# Patient Record
Sex: FEMALE | Race: WHITE | Employment: UNEMPLOYED | URBAN - METROPOLITAN AREA
[De-identification: names, ages, dates, MRNs, and addresses within clinical notes are randomized per-mention and may not be internally consistent; named-entity substitution may affect disease eponyms.]

---

## 2017-09-20 ENCOUNTER — OFFICE VISIT (OUTPATIENT)
Dept: FAMILY MEDICINE CLINIC | Age: 27
End: 2017-09-20

## 2017-09-20 VITALS
WEIGHT: 108.25 LBS | RESPIRATION RATE: 18 BRPM | TEMPERATURE: 96.5 F | SYSTOLIC BLOOD PRESSURE: 110 MMHG | OXYGEN SATURATION: 96 % | HEIGHT: 63 IN | HEART RATE: 63 BPM | BODY MASS INDEX: 19.18 KG/M2 | DIASTOLIC BLOOD PRESSURE: 70 MMHG

## 2017-09-20 DIAGNOSIS — J01.00 ACUTE MAXILLARY SINUSITIS, RECURRENCE NOT SPECIFIED: Primary | ICD-10-CM

## 2017-09-20 PROCEDURE — 99213 OFFICE O/P EST LOW 20 MIN: CPT | Performed by: NURSE PRACTITIONER

## 2017-09-20 RX ORDER — AMOXICILLIN AND CLAVULANATE POTASSIUM 875; 125 MG/1; MG/1
1 TABLET, FILM COATED ORAL 2 TIMES DAILY
Qty: 20 TABLET | Refills: 0 | Status: SHIPPED | OUTPATIENT
Start: 2017-09-20 | End: 2017-09-30

## 2017-09-20 ASSESSMENT — ENCOUNTER SYMPTOMS
WHEEZING: 0
SORE THROAT: 0
EYE DISCHARGE: 0
EYE PAIN: 0
SHORTNESS OF BREATH: 0
SINUS PRESSURE: 1
VOMITING: 0
NAUSEA: 0
TROUBLE SWALLOWING: 0
DIARRHEA: 0
ABDOMINAL PAIN: 0
FACIAL SWELLING: 0
RHINORRHEA: 1
COUGH: 1

## 2018-01-30 ENCOUNTER — OFFICE VISIT (OUTPATIENT)
Dept: FAMILY MEDICINE CLINIC | Age: 28
End: 2018-01-30
Payer: COMMERCIAL

## 2018-01-30 VITALS
OXYGEN SATURATION: 99 % | HEART RATE: 84 BPM | RESPIRATION RATE: 18 BRPM | HEIGHT: 63 IN | TEMPERATURE: 99.4 F | BODY MASS INDEX: 18.98 KG/M2 | WEIGHT: 107.13 LBS

## 2018-01-30 DIAGNOSIS — J01.10 ACUTE FRONTAL SINUSITIS, RECURRENCE NOT SPECIFIED: Primary | ICD-10-CM

## 2018-01-30 PROCEDURE — G8427 DOCREV CUR MEDS BY ELIG CLIN: HCPCS | Performed by: NURSE PRACTITIONER

## 2018-01-30 PROCEDURE — G8420 CALC BMI NORM PARAMETERS: HCPCS | Performed by: NURSE PRACTITIONER

## 2018-01-30 PROCEDURE — G8484 FLU IMMUNIZE NO ADMIN: HCPCS | Performed by: NURSE PRACTITIONER

## 2018-01-30 PROCEDURE — 99213 OFFICE O/P EST LOW 20 MIN: CPT | Performed by: NURSE PRACTITIONER

## 2018-01-30 PROCEDURE — 1036F TOBACCO NON-USER: CPT | Performed by: NURSE PRACTITIONER

## 2018-01-30 RX ORDER — AMOXICILLIN AND CLAVULANATE POTASSIUM 875; 125 MG/1; MG/1
1 TABLET, FILM COATED ORAL 2 TIMES DAILY
Qty: 20 TABLET | Refills: 0 | Status: SHIPPED | OUTPATIENT
Start: 2018-01-30 | End: 2018-02-09

## 2018-01-30 RX ORDER — CETIRIZINE HYDROCHLORIDE 10 MG/1
10 TABLET ORAL DAILY
Qty: 30 TABLET | Refills: 3 | Status: SHIPPED | OUTPATIENT
Start: 2018-01-30 | End: 2022-02-15

## 2018-01-30 NOTE — PROGRESS NOTES
Subjective:      Patient ID: Kala Ying is a 28 y.o. female who presents today for:  Chief Complaint   Patient presents with    Headache     x 9 days. complains of headache, sinus pressure, ear pressure, sore throat. has tried Excedrin with mild relief. Sinusitis   This is a new problem. The current episode started in the past 7 days. The problem has been gradually worsening since onset. There has been no fever. Associated symptoms include congestion, coughing, ear pain, headaches, sinus pressure and a sore throat. Pertinent negatives include no chills, diaphoresis, hoarse voice, shortness of breath, sneezing or swollen glands. Treatments tried: excedrin. The treatment provided mild relief. History reviewed. No pertinent past medical history. Current Outpatient Prescriptions on File Prior to Visit   Medication Sig Dispense Refill    levonorgestrel (MIRENA, 52 MG,) IUD 52 mg 1 each by Intrauterine route once      fluticasone (FLONASE) 50 MCG/ACT nasal spray 2 sprays by Nasal route daily 1 Bottle 3     No current facility-administered medications on file prior to visit. Past Surgical History:   Procedure Laterality Date    CHOLECYSTECTOMY  01/30/2015     Family History   Problem Relation Age of Onset    Cancer Mother     Diabetes Maternal Grandmother     High Blood Pressure Maternal Grandmother     Cancer Maternal Grandfather      Lung     Social History     Social History    Marital status:      Spouse name: N/A    Number of children: N/A    Years of education: N/A     Occupational History    Not on file. Social History Main Topics    Smoking status: Never Smoker    Smokeless tobacco: Never Used    Alcohol use Not on file    Drug use: Unknown    Sexual activity: Not on file     Other Topics Concern    Not on file     Social History Narrative    No narrative on file     Allergies:  Review of patient's allergies indicates no known allergies.     Review of Systems has no wheezes. She has no rhonchi. She has no rales. Lymphadenopathy:     She has no cervical adenopathy. Neurological: She is alert and oriented to person, place, and time. Skin: Skin is warm. No rash noted. She is not diaphoretic. Vitals reviewed. Assessment & Plan:     1. Acute frontal sinusitis, recurrence not specified  amoxicillin-clavulanate (AUGMENTIN) 875-125 MG per tablet    cetirizine (ZYRTEC) 10 MG tablet     No orders of the defined types were placed in this encounter. Orders Placed This Encounter   Medications    amoxicillin-clavulanate (AUGMENTIN) 875-125 MG per tablet     Sig: Take 1 tablet by mouth 2 times daily for 10 days     Dispense:  20 tablet     Refill:  0    cetirizine (ZYRTEC) 10 MG tablet     Sig: Take 1 tablet by mouth daily     Dispense:  30 tablet     Refill:  3     There are no discontinued medications. Return if symptoms worsen or fail to improve, for PCP follow-up. Reviewed with the patient: current clinical status, medications, activities and diet. Side effects, adverse effects of the medication prescribed today, as well as treatment plan/ rationale and result expectations have been discussed with the patient who expresses understanding and desires to proceed. Pt instructions reviewed and given to patient.     Close follow up to evaluate treatment results and for coordination of care. I have reviewed the patient's medical history in detail and updated the computerized patient record.     Immanuel Mcfadden NP

## 2018-02-05 ASSESSMENT — ENCOUNTER SYMPTOMS
HOARSE VOICE: 0
FACIAL SWELLING: 0
TROUBLE SWALLOWING: 0
EYE DISCHARGE: 0
SINUS PRESSURE: 1
WHEEZING: 0
EYE PAIN: 0
ABDOMINAL PAIN: 0
SHORTNESS OF BREATH: 0
COUGH: 1
RHINORRHEA: 1
DIARRHEA: 0
NAUSEA: 0
SORE THROAT: 1
SWOLLEN GLANDS: 0
VOMITING: 0

## 2018-03-21 ENCOUNTER — OFFICE VISIT (OUTPATIENT)
Dept: FAMILY MEDICINE CLINIC | Age: 28
End: 2018-03-21
Payer: COMMERCIAL

## 2018-03-21 VITALS
HEIGHT: 63 IN | OXYGEN SATURATION: 98 % | SYSTOLIC BLOOD PRESSURE: 102 MMHG | BODY MASS INDEX: 19.36 KG/M2 | TEMPERATURE: 98.9 F | DIASTOLIC BLOOD PRESSURE: 76 MMHG | WEIGHT: 109.25 LBS | HEART RATE: 72 BPM | RESPIRATION RATE: 12 BRPM

## 2018-03-21 DIAGNOSIS — J10.1 INFLUENZA B: Primary | ICD-10-CM

## 2018-03-21 DIAGNOSIS — J02.9 PHARYNGITIS, UNSPECIFIED ETIOLOGY: ICD-10-CM

## 2018-03-21 LAB
INFLUENZA A ANTIBODY: ABNORMAL
INFLUENZA B ANTIBODY: ABNORMAL
S PYO AG THROAT QL: NORMAL

## 2018-03-21 PROCEDURE — 99213 OFFICE O/P EST LOW 20 MIN: CPT | Performed by: NURSE PRACTITIONER

## 2018-03-21 PROCEDURE — G8420 CALC BMI NORM PARAMETERS: HCPCS | Performed by: NURSE PRACTITIONER

## 2018-03-21 PROCEDURE — 87804 INFLUENZA ASSAY W/OPTIC: CPT | Performed by: NURSE PRACTITIONER

## 2018-03-21 PROCEDURE — 1036F TOBACCO NON-USER: CPT | Performed by: NURSE PRACTITIONER

## 2018-03-21 PROCEDURE — 87880 STREP A ASSAY W/OPTIC: CPT | Performed by: NURSE PRACTITIONER

## 2018-03-21 PROCEDURE — G8484 FLU IMMUNIZE NO ADMIN: HCPCS | Performed by: NURSE PRACTITIONER

## 2018-03-21 PROCEDURE — G8427 DOCREV CUR MEDS BY ELIG CLIN: HCPCS | Performed by: NURSE PRACTITIONER

## 2018-03-21 RX ORDER — OSELTAMIVIR PHOSPHATE 75 MG/1
75 CAPSULE ORAL 2 TIMES DAILY
Qty: 20 CAPSULE | Refills: 0 | Status: SHIPPED | OUTPATIENT
Start: 2018-03-21 | End: 2018-03-31

## 2018-03-21 RX ORDER — BENZONATATE 100 MG/1
100 CAPSULE ORAL 3 TIMES DAILY PRN
Qty: 21 CAPSULE | Refills: 0 | Status: SHIPPED | OUTPATIENT
Start: 2018-03-21 | End: 2018-03-28

## 2018-03-21 ASSESSMENT — ENCOUNTER SYMPTOMS
ABDOMINAL DISTENTION: 0
CHEST TIGHTNESS: 0
SHORTNESS OF BREATH: 0
SORE THROAT: 1
DIARRHEA: 0
VOMITING: 0
COUGH: 1
ABDOMINAL PAIN: 0
WHEEZING: 0
CONSTIPATION: 0
HEARTBURN: 0
SINUS PRESSURE: 1
RHINORRHEA: 0
HEMOPTYSIS: 0
NAUSEA: 0
SINUS PAIN: 0
TROUBLE SWALLOWING: 0

## 2018-03-21 NOTE — PROGRESS NOTES
Subjective  Marisa Platt, 33 y.o. female presents today with:  Chief Complaint   Patient presents with    Pharyngitis     x yesterday. complains of cough, congestion, sore throat, swollen glands, difficulty swallowing, bilteral ear pain. has tried IBU with no relief. Cough   This is a new problem. The current episode started yesterday. The problem has been rapidly worsening. The problem occurs every few minutes. The cough is non-productive. Associated symptoms include chills, ear pain, a fever, headaches, nasal congestion, postnasal drip, a sore throat and sweats. Pertinent negatives include no chest pain, ear congestion, heartburn, hemoptysis, myalgias, rash, rhinorrhea, shortness of breath, weight loss or wheezing. Nothing aggravates the symptoms. Treatments tried: flonase and ibiprofen. There is no history of asthma, bronchitis or pneumonia. Review of Systems   Constitutional: Positive for chills, diaphoresis, fatigue and fever. Negative for activity change, appetite change and weight loss. HENT: Positive for congestion, ear pain, postnasal drip, sinus pressure and sore throat. Negative for rhinorrhea, sinus pain and trouble swallowing. Respiratory: Positive for cough. Negative for hemoptysis, chest tightness, shortness of breath and wheezing. Cardiovascular: Negative for chest pain. Gastrointestinal: Negative for abdominal distention, abdominal pain, constipation, diarrhea, heartburn, nausea and vomiting. Musculoskeletal: Negative for myalgias. Skin: Negative for rash. Neurological: Positive for headaches. History reviewed. No pertinent past medical history. Past Surgical History:   Procedure Laterality Date    CHOLECYSTECTOMY  01/30/2015     Social History     Social History    Marital status:      Spouse name: N/A    Number of children: N/A    Years of education: N/A     Occupational History    Not on file.      Social History Main Topics    Smoking status: Never Smoker    Smokeless tobacco: Never Used    Alcohol use Not on file    Drug use: Unknown    Sexual activity: Not on file     Other Topics Concern    Not on file     Social History Narrative    No narrative on file     Family History   Problem Relation Age of Onset    Cancer Mother     Diabetes Maternal Grandmother     High Blood Pressure Maternal Grandmother     Cancer Maternal Grandfather      Lung     No Known Allergies  Current Outpatient Prescriptions   Medication Sig Dispense Refill    oseltamivir (TAMIFLU) 75 MG capsule Take 1 capsule by mouth 2 times daily for 10 days 20 capsule 0    benzonatate (TESSALON) 100 MG capsule Take 1 capsule by mouth 3 times daily as needed for Cough 21 capsule 0    cetirizine (ZYRTEC) 10 MG tablet Take 1 tablet by mouth daily 30 tablet 3    levonorgestrel (MIRENA, 52 MG,) IUD 52 mg 1 each by Intrauterine route once      fluticasone (FLONASE) 50 MCG/ACT nasal spray 2 sprays by Nasal route daily 1 Bottle 3     No current facility-administered medications for this visit. PMH, Surgical Hx, Family Hx, and Social Hx reviewed and updated. Health Maintenance reviewed. Objective    Vitals:    03/21/18 1809   BP: 102/76   Pulse: 72   Resp: 12   Temp: 98.9 °F (37.2 °C)   TempSrc: Temporal   SpO2: 98%   Weight: 109 lb 4 oz (49.6 kg)   Height: 5' 3\" (1.6 m)       Physical Exam   Constitutional: She is oriented to person, place, and time. She appears well-developed and well-nourished. No distress. HENT:   Head: Normocephalic and atraumatic. Right Ear: Hearing, tympanic membrane, external ear and ear canal normal.   Left Ear: Hearing, tympanic membrane, external ear and ear canal normal.   Nose: Mucosal edema present. Mouth/Throat: Uvula is midline and mucous membranes are normal. Posterior oropharyngeal erythema present. No oropharyngeal exudate, posterior oropharyngeal edema or tonsillar abscesses. Neck: Normal range of motion. Neck supple.  No

## 2018-09-28 ENCOUNTER — OFFICE VISIT (OUTPATIENT)
Dept: INTERNAL MEDICINE | Age: 28
End: 2018-09-28
Payer: COMMERCIAL

## 2018-09-28 VITALS
HEIGHT: 63 IN | HEART RATE: 53 BPM | OXYGEN SATURATION: 99 % | DIASTOLIC BLOOD PRESSURE: 82 MMHG | WEIGHT: 112.32 LBS | TEMPERATURE: 98.2 F | BODY MASS INDEX: 19.9 KG/M2 | SYSTOLIC BLOOD PRESSURE: 130 MMHG

## 2018-09-28 DIAGNOSIS — B37.2 YEAST INFECTION OF THE SKIN: Primary | ICD-10-CM

## 2018-09-28 PROCEDURE — 1036F TOBACCO NON-USER: CPT | Performed by: PHYSICIAN ASSISTANT

## 2018-09-28 PROCEDURE — G8427 DOCREV CUR MEDS BY ELIG CLIN: HCPCS | Performed by: PHYSICIAN ASSISTANT

## 2018-09-28 PROCEDURE — 99213 OFFICE O/P EST LOW 20 MIN: CPT | Performed by: PHYSICIAN ASSISTANT

## 2018-09-28 PROCEDURE — G8420 CALC BMI NORM PARAMETERS: HCPCS | Performed by: PHYSICIAN ASSISTANT

## 2018-09-28 RX ORDER — FLUTICASONE PROPIONATE 50 MCG
2 SPRAY, SUSPENSION (ML) NASAL DAILY
Qty: 1 BOTTLE | Refills: 3 | Status: SHIPPED | OUTPATIENT
Start: 2018-09-28

## 2018-09-28 RX ORDER — NYSTATIN 100000 [USP'U]/G
POWDER TOPICAL
Qty: 1 BOTTLE | Refills: 0 | Status: SHIPPED | OUTPATIENT
Start: 2018-09-28 | End: 2022-02-15

## 2018-09-28 ASSESSMENT — PATIENT HEALTH QUESTIONNAIRE - PHQ9
SUM OF ALL RESPONSES TO PHQ QUESTIONS 1-9: 0
1. LITTLE INTEREST OR PLEASURE IN DOING THINGS: 0
SUM OF ALL RESPONSES TO PHQ QUESTIONS 1-9: 0
SUM OF ALL RESPONSES TO PHQ9 QUESTIONS 1 & 2: 0
2. FEELING DOWN, DEPRESSED OR HOPELESS: 0

## 2018-09-28 NOTE — PROGRESS NOTES
2018     Heidi George (:  1990) is a 33 y.o. female, here for evaluation of the following medical concerns:    Chief Complaint   Patient presents with    Rash     Pt states she has a rash under both arms, x's 1 month. Rash   This is a new problem. The current episode started more than 1 month ago. The affected locations include the right axilla and left axilla. The rash is characterized by redness and itchiness. She was exposed to nothing. Pertinent negatives include no congestion, cough, facial edema, rhinorrhea, shortness of breath or sore throat. Past treatments include anti-itch cream. The treatment provided mild relief. denies using new deodorants       Review of Systems   Constitutional: Negative for chills. HENT: Negative for congestion, rhinorrhea and sore throat. Respiratory: Negative for cough and shortness of breath. Skin: Positive for rash. Prior to Visit Medications    Medication Sig Taking? Authorizing Provider   fluticasone (FLONASE) 50 MCG/ACT nasal spray 2 sprays by Nasal route daily Yes LOUIE Garcia   nystatin (MYCOSTATIN) 720280 UNIT/GM powder Apply 3 times daily. Yes LOUIE Mcmillan   levonorgestrel (MIRENA, 52 MG,) IUD 52 mg 1 each by Intrauterine route once Yes Historical Provider, MD   cetirizine (ZYRTEC) 10 MG tablet Take 1 tablet by mouth daily  Noy Lange APRN - CNP        Social History   Substance Use Topics    Smoking status: Never Smoker    Smokeless tobacco: Never Used    Alcohol use Not on file        Vitals:    18 0955   BP: 130/82   Pulse: 53   Temp: 98.2 °F (36.8 °C)   TempSrc: Temporal   SpO2: 99%   Weight: 112 lb 5.1 oz (50.9 kg)   Height: 5' 3\" (1.6 m)     Estimated body mass index is 19.9 kg/m² as calculated from the following:    Height as of this encounter: 5' 3\" (1.6 m). Weight as of this encounter: 112 lb 5.1 oz (50.9 kg). Physical Exam   Constitutional: She appears well-developed.    Skin: Skin

## 2018-09-30 ASSESSMENT — ENCOUNTER SYMPTOMS
SHORTNESS OF BREATH: 0
SORE THROAT: 0
COUGH: 0
RHINORRHEA: 0

## 2018-11-20 ENCOUNTER — TELEPHONE (OUTPATIENT)
Dept: INTERNAL MEDICINE | Age: 28
End: 2018-11-20

## 2018-11-20 DIAGNOSIS — F43.9 STRESS: Primary | ICD-10-CM

## 2018-11-21 NOTE — TELEPHONE ENCOUNTER
I have not seen this pt for any psych issues, so I would need a reason for the referral.    Order pending, and you can give her a list of psych in our area.   Other option is seeing Dr Dalton Lindsey

## 2018-12-05 ENCOUNTER — OFFICE VISIT (OUTPATIENT)
Dept: BEHAVIORAL/MENTAL HEALTH CLINIC | Age: 28
End: 2018-12-05
Payer: COMMERCIAL

## 2018-12-05 DIAGNOSIS — F43.23 ADJUSTMENT DISORDER WITH MIXED ANXIETY AND DEPRESSED MOOD: Primary | ICD-10-CM

## 2018-12-05 PROCEDURE — 90791 PSYCH DIAGNOSTIC EVALUATION: CPT | Performed by: PSYCHOLOGIST

## 2018-12-05 PROCEDURE — 1036F TOBACCO NON-USER: CPT | Performed by: PSYCHOLOGIST

## 2018-12-05 ASSESSMENT — PATIENT HEALTH QUESTIONNAIRE - PHQ9
2. FEELING DOWN, DEPRESSED OR HOPELESS: 1
5. POOR APPETITE OR OVEREATING: 0
SUM OF ALL RESPONSES TO PHQ QUESTIONS 1-9: 4
9. THOUGHTS THAT YOU WOULD BE BETTER OFF DEAD, OR OF HURTING YOURSELF: 0
8. MOVING OR SPEAKING SO SLOWLY THAT OTHER PEOPLE COULD HAVE NOTICED. OR THE OPPOSITE, BEING SO FIGETY OR RESTLESS THAT YOU HAVE BEEN MOVING AROUND A LOT MORE THAN USUAL: 1
6. FEELING BAD ABOUT YOURSELF - OR THAT YOU ARE A FAILURE OR HAVE LET YOURSELF OR YOUR FAMILY DOWN: 0
7. TROUBLE CONCENTRATING ON THINGS, SUCH AS READING THE NEWSPAPER OR WATCHING TELEVISION: 0
3. TROUBLE FALLING OR STAYING ASLEEP: 1
SUM OF ALL RESPONSES TO PHQ QUESTIONS 1-9: 4
4. FEELING TIRED OR HAVING LITTLE ENERGY: 1
1. LITTLE INTEREST OR PLEASURE IN DOING THINGS: 0
SUM OF ALL RESPONSES TO PHQ9 QUESTIONS 1 & 2: 1

## 2018-12-05 NOTE — PATIENT INSTRUCTIONS
1. Dedicate 5 minutes 3x/day to practice the deep breathing    2. Review the list of apps and give some a try! [facility] Box, CBTi  and progressive muscle relaxation for sleep, etc.)    3. Read the below information about stress management    4. Return in about 2-3 weeks    \"The entire autonomic nervous system (and through it, our internal organs and glands) is largely driven by our breathing patterns. By changing our breathing we can influence millions of biochemical reactions in our body, producing more relaxing substances such as endorphins and fewer anxiety-producing ones like adrenaline and higher blood acidity. Mindfulness of the breath is so effective that it is common to all meditative and prayer traditions. \" Anxiety Fear & Breathing - Breathing. com    \"When overcoming high levels of anxiety, it is important to learn the techniques of correct breathing. Many people who live with high levels of anxiety are known to breathe through their chest. Shallow breathing through the chest means you are disrupting the balance of oxygen and carbon dioxide necessary to be in a relaxed state. This type of breathing will perpetuate the symptoms of anxiety. \" [employer]. com      Diaphragmatic Breathing             _____________________________________________________________________________  1. Sit in a comfortable position    2. Place one hand on your stomach and the other on your chest    3. Try to breathe so that only your stomach rises and falls    As you inhale, concentrate on your chest remaining relatively still while your stomach rises. It may be helpful for you to imagine that your pants are too big and you need to push your stomach out to hold them up. When exhaling, allow your stomach to fall in and the air to fully escape. Inhale slowly. You may choose to hold the air in for about a second. Exhale slowly.   Dont push the air out, but just let the natural pressure of your body slowly move it than 20 years of experience. In addition to viewing Dr. Lj Souza videos, you can read a variety of articles related to anxiety, meditation, and stress management. Anxiety Free  Platform: [employer]   Cost: Free  Meditation requires mindfulness and a sense of presence in your thoughts. Hypnosis is one step beyond meditation. It works by sending signals to your brain and transforming it almost unknowingly. The creators of this crystal say that its audio recordings contain subliminal signals that speak to the subconscious with powerful effect.  Hypnosis, like meditation, requires practice, and the goal is to get each user to a place where self-hypnosis is possible in order to reduce anxiety. Relax & Rest Guided Meditations  Platform: [employer] and [employer]  Cost: $0.99  While group meetings and discussions are always an option, some people find relaxation more easily on their own. This crystal lets you relax in the space of your own home or office with three guided meditations. Breath Awareness Guided Meditation (5 minutes), Deep Rested Guided Meditation (13 minutes), and Whole Body Guided Relaxation (24 minutes) are designed specifically to help you relax and sink into a peaceful meditation moment. Equanimity - Meditation Timer & Tracker  Platform: [employer]   Cost: $4.99  Meditation is one way you can cope with the symptoms and side effects of anxiety. People who meditate can eventually train themselves to stay calm when feeling stressed or anxious. Equanimity - Meditation Timer & Tracker is simple and straightforward. Time each session and watch for visual light cues to let you know how long youve been meditating. Take notes about each session, and watch your progress as you learn to manage your stress and anxiety.   Virtual Hope Box  Platform: iPhone and Android  Cost: Free  The [employer] Data Box (VHB) is a smartphone application that contains simple tools to help with coping, relaxation, distraction, and positive thinking via

## 2018-12-17 ENCOUNTER — OFFICE VISIT (OUTPATIENT)
Dept: BEHAVIORAL/MENTAL HEALTH CLINIC | Age: 28
End: 2018-12-17
Payer: COMMERCIAL

## 2018-12-17 DIAGNOSIS — F43.23 ADJUSTMENT DISORDER WITH MIXED ANXIETY AND DEPRESSED MOOD: Primary | ICD-10-CM

## 2018-12-17 PROCEDURE — 1036F TOBACCO NON-USER: CPT | Performed by: PSYCHOLOGIST

## 2018-12-17 PROCEDURE — 90832 PSYTX W PT 30 MINUTES: CPT | Performed by: PSYCHOLOGIST

## 2018-12-17 ASSESSMENT — PATIENT HEALTH QUESTIONNAIRE - PHQ9
9. THOUGHTS THAT YOU WOULD BE BETTER OFF DEAD, OR OF HURTING YOURSELF: 0
1. LITTLE INTEREST OR PLEASURE IN DOING THINGS: 0
5. POOR APPETITE OR OVEREATING: 2
7. TROUBLE CONCENTRATING ON THINGS, SUCH AS READING THE NEWSPAPER OR WATCHING TELEVISION: 1
4. FEELING TIRED OR HAVING LITTLE ENERGY: 1
8. MOVING OR SPEAKING SO SLOWLY THAT OTHER PEOPLE COULD HAVE NOTICED. OR THE OPPOSITE, BEING SO FIGETY OR RESTLESS THAT YOU HAVE BEEN MOVING AROUND A LOT MORE THAN USUAL: 0
SUM OF ALL RESPONSES TO PHQ QUESTIONS 1-9: 5
2. FEELING DOWN, DEPRESSED OR HOPELESS: 0
SUM OF ALL RESPONSES TO PHQ9 QUESTIONS 1 & 2: 0
6. FEELING BAD ABOUT YOURSELF - OR THAT YOU ARE A FAILURE OR HAVE LET YOURSELF OR YOUR FAMILY DOWN: 0
10. IF YOU CHECKED OFF ANY PROBLEMS, HOW DIFFICULT HAVE THESE PROBLEMS MADE IT FOR YOU TO DO YOUR WORK, TAKE CARE OF THINGS AT HOME, OR GET ALONG WITH OTHER PEOPLE: 1
3. TROUBLE FALLING OR STAYING ASLEEP: 1
SUM OF ALL RESPONSES TO PHQ QUESTIONS 1-9: 5

## 2018-12-17 NOTE — PROGRESS NOTES
target / promote Symptom relief. Pt Behavioral Change Plan:    1. Review the list of unhelpful thinking patterns and pick out any \"go tos\"  2. Pay attention to your body's red flags that you might be engaged in this type of thinking  3. Jump in and apply rational counter statements to increase the balance in your thinking  4. Return in 2-3 weeks    Please note this report has been partially produced using speech recognition software  And may cause contain errors related to that system including grammar, punctuation and spelling as well as words and phrases that may seem inappropriate. If there are questions or concerns please feel free to contact me to clarify.

## 2018-12-17 NOTE — PATIENT INSTRUCTIONS
thoughts:    Anxiety-Producing Thought:       I cant go to the mall with my hair like this--everyone will notice me. Rational Counterstatement Example:  My hair looks a little messy, but everyone will be too occupied with other things to notice. Even if they do notice, I doubt they would care.    -------------------------------------------------------------------------------    Anxiety-Producing Thought:  I know I wont be able to finish my work on time. Rational Counterstatement Example:        --------------------------------------------------------------------------------    Anxiety-Producing Thought:  I cant face by boss. Shes going to yell at me.     Rational Counterstatement Example:          Next, think of three examples of anxiety-producing thoughts and rational counterstatements from your own life:    Example 1:    Anxiety-Producing Thought:      Rational Counterstatement:      Example 2:    Anxiety-Producing Thought:      Rational Counterstatement:        Example 3:    Anxiety-Producing Thought:      Rational Counterstatement:

## 2019-01-16 ENCOUNTER — OFFICE VISIT (OUTPATIENT)
Dept: BEHAVIORAL/MENTAL HEALTH CLINIC | Age: 29
End: 2019-01-16
Payer: COMMERCIAL

## 2019-01-16 DIAGNOSIS — F43.23 ADJUSTMENT DISORDER WITH MIXED ANXIETY AND DEPRESSED MOOD: Primary | ICD-10-CM

## 2019-01-16 PROCEDURE — 90832 PSYTX W PT 30 MINUTES: CPT | Performed by: PSYCHOLOGIST

## 2019-01-16 PROCEDURE — 1036F TOBACCO NON-USER: CPT | Performed by: PSYCHOLOGIST

## 2019-01-16 ASSESSMENT — PATIENT HEALTH QUESTIONNAIRE - PHQ9
5. POOR APPETITE OR OVEREATING: 0
6. FEELING BAD ABOUT YOURSELF - OR THAT YOU ARE A FAILURE OR HAVE LET YOURSELF OR YOUR FAMILY DOWN: 0
3. TROUBLE FALLING OR STAYING ASLEEP: 1
SUM OF ALL RESPONSES TO PHQ QUESTIONS 1-9: 3
4. FEELING TIRED OR HAVING LITTLE ENERGY: 1
7. TROUBLE CONCENTRATING ON THINGS, SUCH AS READING THE NEWSPAPER OR WATCHING TELEVISION: 1
SUM OF ALL RESPONSES TO PHQ9 QUESTIONS 1 & 2: 0
10. IF YOU CHECKED OFF ANY PROBLEMS, HOW DIFFICULT HAVE THESE PROBLEMS MADE IT FOR YOU TO DO YOUR WORK, TAKE CARE OF THINGS AT HOME, OR GET ALONG WITH OTHER PEOPLE: 1
2. FEELING DOWN, DEPRESSED OR HOPELESS: 0
SUM OF ALL RESPONSES TO PHQ QUESTIONS 1-9: 3
9. THOUGHTS THAT YOU WOULD BE BETTER OFF DEAD, OR OF HURTING YOURSELF: 0
8. MOVING OR SPEAKING SO SLOWLY THAT OTHER PEOPLE COULD HAVE NOTICED. OR THE OPPOSITE, BEING SO FIGETY OR RESTLESS THAT YOU HAVE BEEN MOVING AROUND A LOT MORE THAN USUAL: 0
1. LITTLE INTEREST OR PLEASURE IN DOING THINGS: 0

## 2019-02-01 ENCOUNTER — OFFICE VISIT (OUTPATIENT)
Dept: BEHAVIORAL/MENTAL HEALTH CLINIC | Age: 29
End: 2019-02-01
Payer: COMMERCIAL

## 2019-02-01 DIAGNOSIS — F43.23 ADJUSTMENT DISORDER WITH MIXED ANXIETY AND DEPRESSED MOOD: Primary | ICD-10-CM

## 2019-02-01 PROCEDURE — 90832 PSYTX W PT 30 MINUTES: CPT | Performed by: PSYCHOLOGIST

## 2019-02-01 PROCEDURE — 1036F TOBACCO NON-USER: CPT | Performed by: PSYCHOLOGIST

## 2019-02-01 ASSESSMENT — PATIENT HEALTH QUESTIONNAIRE - PHQ9
7. TROUBLE CONCENTRATING ON THINGS, SUCH AS READING THE NEWSPAPER OR WATCHING TELEVISION: 0
8. MOVING OR SPEAKING SO SLOWLY THAT OTHER PEOPLE COULD HAVE NOTICED. OR THE OPPOSITE, BEING SO FIGETY OR RESTLESS THAT YOU HAVE BEEN MOVING AROUND A LOT MORE THAN USUAL: 0
SUM OF ALL RESPONSES TO PHQ QUESTIONS 1-9: 4
6. FEELING BAD ABOUT YOURSELF - OR THAT YOU ARE A FAILURE OR HAVE LET YOURSELF OR YOUR FAMILY DOWN: 0
4. FEELING TIRED OR HAVING LITTLE ENERGY: 1
3. TROUBLE FALLING OR STAYING ASLEEP: 1
2. FEELING DOWN, DEPRESSED OR HOPELESS: 1
5. POOR APPETITE OR OVEREATING: 0
10. IF YOU CHECKED OFF ANY PROBLEMS, HOW DIFFICULT HAVE THESE PROBLEMS MADE IT FOR YOU TO DO YOUR WORK, TAKE CARE OF THINGS AT HOME, OR GET ALONG WITH OTHER PEOPLE: 1
9. THOUGHTS THAT YOU WOULD BE BETTER OFF DEAD, OR OF HURTING YOURSELF: 0
1. LITTLE INTEREST OR PLEASURE IN DOING THINGS: 1
SUM OF ALL RESPONSES TO PHQ QUESTIONS 1-9: 4
SUM OF ALL RESPONSES TO PHQ9 QUESTIONS 1 & 2: 2

## 2020-05-28 ENCOUNTER — TELEMEDICINE (OUTPATIENT)
Dept: INTERNAL MEDICINE | Age: 30
End: 2020-05-28
Payer: COMMERCIAL

## 2020-05-28 PROCEDURE — 99213 OFFICE O/P EST LOW 20 MIN: CPT | Performed by: PHYSICIAN ASSISTANT

## 2020-05-28 PROCEDURE — G8427 DOCREV CUR MEDS BY ELIG CLIN: HCPCS | Performed by: PHYSICIAN ASSISTANT

## 2020-05-28 RX ORDER — ACYCLOVIR 50 MG/G
OINTMENT TOPICAL
Qty: 1 TUBE | Refills: 1 | Status: SHIPPED | OUTPATIENT
Start: 2020-05-28 | End: 2020-10-13

## 2020-05-28 RX ORDER — FAMCICLOVIR 500 MG/1
1500 TABLET, FILM COATED ORAL ONCE
Qty: 3 TABLET | Refills: 5 | Status: SHIPPED | OUTPATIENT
Start: 2020-05-28 | End: 2020-05-28

## 2020-05-28 ASSESSMENT — ENCOUNTER SYMPTOMS: COLOR CHANGE: 1

## 2020-05-28 NOTE — PROGRESS NOTES
2020    TELEHEALTH EVALUATION -- Audio/Visual (During ZZPNE-27 public health emergency)    Due to COVID 19 outbreak, patient's office visit was converted to a virtual visit. Patient was contacted and agreed to proceed with a virtual visit via [email or web address] me  The risks and benefits of converting to a virtual visit were discussed in light of the current infectious disease epidemic. Patient also understood that insurance coverage and co-pays are up to their individual insurance plans. HPI:    Jasmyn Lyons (:  1990) has requested an audio/video evaluation for the following concern(s):    Chief Complaint   Patient presents with    Mouth Lesions     recurrent        Recurrent cold sores  Usually has them rarely, but recenltly, had them back to back , 3 times this month   Started after sun exposure         Review of Systems   Constitutional: Negative for chills and fever. Skin: Positive for color change and wound. Prior to Visit Medications    Medication Sig Taking? Authorizing Provider   acyclovir (ZOVIRAX) 5 % ointment Apply topically 5 times daily. Yes LOUIE Truong   famciclovir (FAMVIR) 500 MG tablet Take 3 tablets by mouth once for 1 dose Take at the first sign of cold sores Yes LOUIE Harmon   fluticasone (FLONASE) 50 MCG/ACT nasal spray 2 sprays by Nasal route daily  LOUIE Truong   nystatin (MYCOSTATIN) 175998 UNIT/GM powder Apply 3 times daily.   LOUIE Rojas   cetirizine (ZYRTEC) 10 MG tablet Take 1 tablet by mouth daily  BLAIRE Larson - CNP   levonorgestrel (MIRENA, 52 MG,) IUD 52 mg 1 each by Intrauterine route once  Historical Provider, MD       Social History     Tobacco Use    Smoking status: Never Smoker    Smokeless tobacco: Never Used   Substance Use Topics    Alcohol use: Not on file    Drug use: Not on file            PHYSICAL EXAMINATION:  [ INSTRUCTIONS:  \"[x]\" Indicates a positive item  \"[]\" Indicates a negative item  -- DELETE ALL

## 2020-06-05 ENCOUNTER — TELEPHONE (OUTPATIENT)
Dept: INTERNAL MEDICINE | Age: 30
End: 2020-06-05

## 2020-09-30 ENCOUNTER — TELEPHONE (OUTPATIENT)
Dept: INTERNAL MEDICINE | Age: 30
End: 2020-09-30

## 2020-09-30 NOTE — TELEPHONE ENCOUNTER
Pt called to say she was seen in June for cold sores. Insurance denied to cover the acyclovir ointment at the time. She would like to know if there is anything else that can be prescribed that will be covered. She is getting a cold sore again. According to [email or web address] 5g is around $14. 15g at [facility] is $27

## 2020-10-05 NOTE — TELEPHONE ENCOUNTER
I looked up that cost on good rx. 5 grams would cost pt around $14 if you wanted her to stay with acyclovir

## 2022-02-15 ENCOUNTER — OFFICE VISIT (OUTPATIENT)
Dept: INTERNAL MEDICINE | Age: 32
End: 2022-02-15
Payer: COMMERCIAL

## 2022-02-15 VITALS
WEIGHT: 108 LBS | SYSTOLIC BLOOD PRESSURE: 100 MMHG | HEIGHT: 63 IN | BODY MASS INDEX: 19.14 KG/M2 | OXYGEN SATURATION: 100 % | HEART RATE: 56 BPM | DIASTOLIC BLOOD PRESSURE: 70 MMHG

## 2022-02-15 DIAGNOSIS — Z00.00 ENCOUNTER FOR WELL ADULT EXAM WITHOUT ABNORMAL FINDINGS: ICD-10-CM

## 2022-02-15 DIAGNOSIS — R51.9 ACHING HEADACHE: ICD-10-CM

## 2022-02-15 DIAGNOSIS — Z00.00 ENCOUNTER FOR WELL ADULT EXAM WITHOUT ABNORMAL FINDINGS: Primary | ICD-10-CM

## 2022-02-15 DIAGNOSIS — Z28.21 REFUSED INFLUENZA VACCINE: ICD-10-CM

## 2022-02-15 DIAGNOSIS — R09.81 SINUS CONGESTION: ICD-10-CM

## 2022-02-15 LAB
ALBUMIN SERPL-MCNC: 4.7 G/DL (ref 3.5–4.6)
ALP BLD-CCNC: 68 U/L (ref 40–130)
ALT SERPL-CCNC: 8 U/L (ref 0–33)
ANION GAP SERPL CALCULATED.3IONS-SCNC: 10 MEQ/L (ref 9–15)
AST SERPL-CCNC: 17 U/L (ref 0–35)
BASOPHILS ABSOLUTE: 0 K/UL (ref 0–0.2)
BASOPHILS RELATIVE PERCENT: 0.7 %
BILIRUB SERPL-MCNC: 0.5 MG/DL (ref 0.2–0.7)
BUN BLDV-MCNC: 13 MG/DL (ref 6–20)
CALCIUM SERPL-MCNC: 9.3 MG/DL (ref 8.5–9.9)
CHLORIDE BLD-SCNC: 104 MEQ/L (ref 95–107)
CHOLESTEROL, TOTAL: 125 MG/DL (ref 0–199)
CO2: 23 MEQ/L (ref 20–31)
CREAT SERPL-MCNC: 0.94 MG/DL (ref 0.5–0.9)
EOSINOPHILS ABSOLUTE: 0.1 K/UL (ref 0–0.7)
EOSINOPHILS RELATIVE PERCENT: 1.4 %
GFR AFRICAN AMERICAN: >60
GFR NON-AFRICAN AMERICAN: >60
GLOBULIN: 3.1 G/DL (ref 2.3–3.5)
GLUCOSE BLD-MCNC: 91 MG/DL (ref 70–99)
HCT VFR BLD CALC: 41.9 % (ref 37–47)
HDLC SERPL-MCNC: 51 MG/DL (ref 40–59)
HEMOGLOBIN: 13.8 G/DL (ref 12–16)
LDL CHOLESTEROL CALCULATED: 65 MG/DL (ref 0–129)
LYMPHOCYTES ABSOLUTE: 1.8 K/UL (ref 1–4.8)
LYMPHOCYTES RELATIVE PERCENT: 30.9 %
MCH RBC QN AUTO: 29.6 PG (ref 27–31.3)
MCHC RBC AUTO-ENTMCNC: 33 % (ref 33–37)
MCV RBC AUTO: 89.8 FL (ref 82–100)
MONOCYTES ABSOLUTE: 0.4 K/UL (ref 0.2–0.8)
MONOCYTES RELATIVE PERCENT: 6.5 %
NEUTROPHILS ABSOLUTE: 3.6 K/UL (ref 1.4–6.5)
NEUTROPHILS RELATIVE PERCENT: 60.5 %
PDW BLD-RTO: 12.6 % (ref 11.5–14.5)
PLATELET # BLD: 287 K/UL (ref 130–400)
POTASSIUM SERPL-SCNC: 4.7 MEQ/L (ref 3.4–4.9)
RBC # BLD: 4.67 M/UL (ref 4.2–5.4)
SODIUM BLD-SCNC: 137 MEQ/L (ref 135–144)
TOTAL PROTEIN: 7.8 G/DL (ref 6.3–8)
TRIGL SERPL-MCNC: 45 MG/DL (ref 0–150)
WBC # BLD: 6 K/UL (ref 4.8–10.8)

## 2022-02-15 PROCEDURE — 36415 COLL VENOUS BLD VENIPUNCTURE: CPT | Performed by: PHYSICIAN ASSISTANT

## 2022-02-15 PROCEDURE — 99395 PREV VISIT EST AGE 18-39: CPT | Performed by: PHYSICIAN ASSISTANT

## 2022-02-15 PROCEDURE — G8484 FLU IMMUNIZE NO ADMIN: HCPCS | Performed by: PHYSICIAN ASSISTANT

## 2022-02-15 SDOH — ECONOMIC STABILITY: FOOD INSECURITY: WITHIN THE PAST 12 MONTHS, THE FOOD YOU BOUGHT JUST DIDN'T LAST AND YOU DIDN'T HAVE MONEY TO GET MORE.: NEVER TRUE

## 2022-02-15 SDOH — ECONOMIC STABILITY: FOOD INSECURITY: WITHIN THE PAST 12 MONTHS, YOU WORRIED THAT YOUR FOOD WOULD RUN OUT BEFORE YOU GOT MONEY TO BUY MORE.: NEVER TRUE

## 2022-02-15 ASSESSMENT — PATIENT HEALTH QUESTIONNAIRE - PHQ9
SUM OF ALL RESPONSES TO PHQ QUESTIONS 1-9: 0
1. LITTLE INTEREST OR PLEASURE IN DOING THINGS: 0
6. FEELING BAD ABOUT YOURSELF - OR THAT YOU ARE A FAILURE OR HAVE LET YOURSELF OR YOUR FAMILY DOWN: 0
2. FEELING DOWN, DEPRESSED OR HOPELESS: 0
5. POOR APPETITE OR OVEREATING: 0
SUM OF ALL RESPONSES TO PHQ QUESTIONS 1-9: 0
8. MOVING OR SPEAKING SO SLOWLY THAT OTHER PEOPLE COULD HAVE NOTICED. OR THE OPPOSITE, BEING SO FIGETY OR RESTLESS THAT YOU HAVE BEEN MOVING AROUND A LOT MORE THAN USUAL: 0
SUM OF ALL RESPONSES TO PHQ9 QUESTIONS 1 & 2: 0
10. IF YOU CHECKED OFF ANY PROBLEMS, HOW DIFFICULT HAVE THESE PROBLEMS MADE IT FOR YOU TO DO YOUR WORK, TAKE CARE OF THINGS AT HOME, OR GET ALONG WITH OTHER PEOPLE: 0
4. FEELING TIRED OR HAVING LITTLE ENERGY: 0
7. TROUBLE CONCENTRATING ON THINGS, SUCH AS READING THE NEWSPAPER OR WATCHING TELEVISION: 0
3. TROUBLE FALLING OR STAYING ASLEEP: 0
9. THOUGHTS THAT YOU WOULD BE BETTER OFF DEAD, OR OF HURTING YOURSELF: 0

## 2022-02-15 ASSESSMENT — ENCOUNTER SYMPTOMS: RESPIRATORY NEGATIVE: 1

## 2022-02-15 ASSESSMENT — SOCIAL DETERMINANTS OF HEALTH (SDOH): HOW HARD IS IT FOR YOU TO PAY FOR THE VERY BASICS LIKE FOOD, HOUSING, MEDICAL CARE, AND HEATING?: NOT HARD AT ALL

## 2022-02-15 NOTE — PROGRESS NOTES
Well Adult Note  Name: Jam Davila Date: 2/15/2022   MRN: [id number] Sex: Female   Age: 27 y.o. Ethnicity: Non- / Non    : 1990 Race: White (non-)      Macario Casanova is here for well adult exam.  History:  No new concerns  Has Mirena  Had HA's , treats with Aleve . She is looking for her triggers   Has sinus congestion, uses Flonase       Review of Systems   Constitutional: Negative. HENT: Negative. Respiratory: Negative. Cardiovascular: Negative. Genitourinary: Negative. Musculoskeletal: Negative. Neurological: Negative. Hematological: Negative. Psychiatric/Behavioral: Negative. No Known Allergies      Prior to Visit Medications    Medication Sig Taking? Authorizing Provider   fluticasone (FLONASE) 50 MCG/ACT nasal spray 2 sprays by Nasal route daily  Patient taking differently: 2 sprays by Nasal route as needed  Yes LOUIE Isabel   levonorgestrel (MIRENA, 52 MG,) IUD 52 mg 1 each by Intrauterine route once Yes Historical Provider, MD       History reviewed. No pertinent past medical history. Past Surgical History:   Procedure Laterality Date    CHOLECYSTECTOMY  2015         Family History   Problem Relation Age of Onset    Cancer Mother     Diabetes Maternal Grandmother     High Blood Pressure Maternal Grandmother     Cancer Maternal Grandfather         Lung       Social History     Tobacco Use    Smoking status: Never Smoker    Smokeless tobacco: Never Used   Substance Use Topics    Alcohol use: Not on file    Drug use: Not on file       Objective   /70   Pulse 56   Ht 5' 3\" (1.6 m) Comment: patient reported  Wt 108 lb (49 kg)   SpO2 100%   BMI 19.13 kg/m²   Wt Readings from Last 3 Encounters:   02/15/22 108 lb (49 kg)   18 112 lb 5.1 oz (50.9 kg)   18 109 lb 4 oz (49.6 kg)     There were no vitals filed for this visit. Physical Exam  Vitals reviewed.    Constitutional: Appearance: Normal appearance. HENT:      Head: Normocephalic. Eyes:      Extraocular Movements: Extraocular movements intact. Conjunctiva/sclera: Conjunctivae normal.      Pupils: Pupils are equal, round, and reactive to light. Cardiovascular:      Rate and Rhythm: Normal rate and regular rhythm. Heart sounds: Normal heart sounds. Pulmonary:      Effort: Pulmonary effort is normal.      Breath sounds: Normal breath sounds. Abdominal:      General: Bowel sounds are normal.      Palpations: Abdomen is soft. Musculoskeletal:         General: Normal range of motion. Neurological:      Mental Status: She is alert and oriented to person, place, and time. Psychiatric:         Mood and Affect: Mood normal.         Behavior: Behavior normal.         Thought Content: Thought content normal.         Judgment: Judgment normal.           Assessment   Plan   1. Encounter for well adult exam without abnormal findings  -     CBC Auto Differential; Future  -     Comprehensive Metabolic Panel; Future  -     Lipid Panel; Future  2. Aching headache  3. Sinus congestion        -     Pt will start flonase to rule out sinus HA, and then look for triggers    4.  Refused influenza vaccine         Personalized Preventive Plan   Current Health Maintenance Status  Immunization History   Administered Date(s) Administered    COVID-19, Pfizer Purple top, DILUTE for use, 12+ yrs, 30mcg/0.3mL dose 09/21/2021        Health Maintenance   Topic Date Due    Hepatitis C screen  Never done    Varicella vaccine (1 of 2 - 2-dose childhood series) Never done    HIV screen  Never done    DTaP/Tdap/Td vaccine (1 - Tdap) Never done    Cervical cancer screen  Never done    Flu vaccine (1) Never done    COVID-19 Vaccine (2 - Pfizer 3-dose series) 10/12/2021    Depression Screen  02/15/2023    Hepatitis A vaccine  Aged Out    Hepatitis B vaccine  Aged Out    Hib vaccine  Aged Out    Meningococcal (ACWY) vaccine  Aged Out    Pneumococcal 0-64 years Vaccine  Aged Out     Recommendations for Preventive Services Due: see orders and patient instructions/AVS.    Return in 1 year (on 2/15/2023).

## 2022-02-15 NOTE — PATIENT INSTRUCTIONS
Well Visit, Ages 25 to 48: Care Instructions  Overview     Well visits can help you stay healthy. Your doctor has checked your overall health and may have suggested ways to take good care of yourself. Your doctor also may have recommended tests. At home, you can help prevent illness with healthy eating, regular exercise, and other steps. Follow-up care is a key part of your treatment and safety. Be sure to make and go to all appointments, and call your doctor if you are having problems. It's also a good idea to know your test results and keep a list of the medicines you take. How can you care for yourself at home? · Get screening tests that you and your doctor decide on. Screening helps find diseases before any symptoms appear. · Eat healthy foods. Choose fruits, vegetables, whole grains, protein, and low-fat dairy foods. Limit fat, especially saturated fat. Reduce salt in your diet. · Limit alcohol. If you are a man, have no more than 2 drinks a day or 14 drinks a week. If you are a woman, have no more than 1 drink a day or 7 drinks a week. · Get at least 30 minutes of physical activity on most days of the week. Walking is a good choice. You also may want to do other activities, such as running, swimming, cycling, or playing tennis or team sports. Discuss any changes in your exercise program with your doctor. · Reach and stay at a healthy weight. This will lower your risk for many problems, such as obesity, diabetes, heart disease, and high blood pressure. · Do not smoke or allow others to smoke around you. If you need help quitting, talk to your doctor about stop-smoking programs and medicines. These can increase your chances of quitting for good. · Care for your mental health. It is easy to get weighed down by worry and stress. Learn strategies to manage stress, like deep breathing and mindfulness, and stay connected with your family and community.  If you find you often feel sad or hopeless, talk with your doctor. Treatment can help. · Talk to your doctor about whether you have any risk factors for sexually transmitted infections (STIs). You can help prevent STIs if you wait to have sex with a new partner (or partners) until you've each been tested for STIs. It also helps if you use condoms (male or female condoms) and if you limit your sex partners to one person who only has sex with you. Vaccines are available for some STIs, such as HPV. · Use birth control if it's important to you to prevent pregnancy. Talk with your doctor about the choices available and what might be best for you. · If you think you may have a problem with alcohol or drug use, talk to your doctor. This includes prescription medicines (such as amphetamines and opioids) and illegal drugs (such as cocaine and methamphetamine). Your doctor can help you figure out what type of treatment is best for you. · Protect your skin from too much sun. When you're outdoors from 10 a.m. to 4 p.m., stay in the shade or cover up with clothing and a hat with a wide brim. Wear sunglasses that block UV rays. Even when it's cloudy, put broad-spectrum sunscreen (SPF 30 or higher) on any exposed skin. · See a dentist one or two times a year for checkups and to have your teeth cleaned. · Wear a seat belt in the car. When should you call for help? Watch closely for changes in your health, and be sure to contact your doctor if you have any problems or symptoms that concern you. Where can you learn more? Go to [email or web address] and sign in to your [employer] account. Enter P072 in the [facility] box to learn more about \"Well Visit, Ages 25 to 48: Care Instructions. \"     If you do not have an account, please click on the \"Sign Up Now\" link. Current as of: October 6, 2021               Content Version: 13.1  © 1236-5225 [employer]. Care instructions adapted under license by [facility] [place] ([facility]).  If you have questions about a medical condition or this instruction, always ask your healthcare professional. [place] [address] any warranty or liability for your use of this information.

## 2022-02-23 ENCOUNTER — TELEPHONE (OUTPATIENT)
Dept: INTERNAL MEDICINE | Age: 32
End: 2022-02-23

## 2022-02-23 DIAGNOSIS — F32.A ANXIETY AND DEPRESSION: Primary | ICD-10-CM

## 2022-02-23 DIAGNOSIS — F41.9 ANXIETY AND DEPRESSION: Primary | ICD-10-CM

## 2022-02-23 NOTE — TELEPHONE ENCOUNTER
Pt called requesting a new referral to see Dr. Lindsay Roach. Can she be notified when it's is placed. She'd like an appointment with her a.s.a.p.     Thank you

## 2023-04-28 LAB
CHLAMYDIA TRACH., AMPLIFIED: NEGATIVE
N. GONORRHEA, AMPLIFIED: NEGATIVE

## 2024-02-20 ENCOUNTER — OFFICE VISIT (OUTPATIENT)
Dept: INTERNAL MEDICINE | Age: 34
End: 2024-02-20
Payer: COMMERCIAL

## 2024-02-20 VITALS
BODY MASS INDEX: 19.67 KG/M2 | TEMPERATURE: 98.3 F | HEART RATE: 64 BPM | OXYGEN SATURATION: 100 % | SYSTOLIC BLOOD PRESSURE: 110 MMHG | HEIGHT: 63 IN | RESPIRATION RATE: 16 BRPM | DIASTOLIC BLOOD PRESSURE: 70 MMHG | WEIGHT: 111 LBS

## 2024-02-20 DIAGNOSIS — R39.9 UTI SYMPTOMS: ICD-10-CM

## 2024-02-20 DIAGNOSIS — K64.9 ACUTE HEMORRHOID: ICD-10-CM

## 2024-02-20 DIAGNOSIS — R39.9 UTI SYMPTOMS: Primary | ICD-10-CM

## 2024-02-20 LAB
BILIRUBIN, POC: NEGATIVE
BLOOD URINE, POC: NORMAL
CLARITY, POC: CLEAR
COLOR, POC: NORMAL
GLUCOSE URINE, POC: NEGATIVE
KETONES, POC: NEGATIVE
LEUKOCYTE EST, POC: NEGATIVE
NITRITE, POC: NEGATIVE
PH, POC: 6
PROTEIN, POC: NEGATIVE
SPECIFIC GRAVITY, POC: 1.01
UROBILINOGEN, POC: 0.2

## 2024-02-20 PROCEDURE — 81003 URINALYSIS AUTO W/O SCOPE: CPT | Performed by: NURSE PRACTITIONER

## 2024-02-20 PROCEDURE — 99214 OFFICE O/P EST MOD 30 MIN: CPT | Performed by: NURSE PRACTITIONER

## 2024-02-20 PROCEDURE — G8484 FLU IMMUNIZE NO ADMIN: HCPCS | Performed by: NURSE PRACTITIONER

## 2024-02-20 PROCEDURE — 1036F TOBACCO NON-USER: CPT | Performed by: NURSE PRACTITIONER

## 2024-02-20 PROCEDURE — G8427 DOCREV CUR MEDS BY ELIG CLIN: HCPCS | Performed by: NURSE PRACTITIONER

## 2024-02-20 PROCEDURE — G8420 CALC BMI NORM PARAMETERS: HCPCS | Performed by: NURSE PRACTITIONER

## 2024-02-20 RX ORDER — HYDROCORTISONE 25 MG/G
CREAM TOPICAL
Qty: 28 G | Refills: 1 | Status: SHIPPED | OUTPATIENT
Start: 2024-02-20

## 2024-02-20 RX ORDER — NITROFURANTOIN 25; 75 MG/1; MG/1
100 CAPSULE ORAL 2 TIMES DAILY
Qty: 14 CAPSULE | Refills: 0 | Status: SHIPPED | OUTPATIENT
Start: 2024-02-20 | End: 2024-02-27

## 2024-02-20 ASSESSMENT — PATIENT HEALTH QUESTIONNAIRE - PHQ9
SUM OF ALL RESPONSES TO PHQ QUESTIONS 1-9: 0
SUM OF ALL RESPONSES TO PHQ QUESTIONS 1-9: 0
1. LITTLE INTEREST OR PLEASURE IN DOING THINGS: 0
SUM OF ALL RESPONSES TO PHQ9 QUESTIONS 1 & 2: 0
2. FEELING DOWN, DEPRESSED OR HOPELESS: 0
SUM OF ALL RESPONSES TO PHQ QUESTIONS 1-9: 0
SUM OF ALL RESPONSES TO PHQ QUESTIONS 1-9: 0

## 2024-02-20 ASSESSMENT — ENCOUNTER SYMPTOMS
NAUSEA: 0
ANAL BLEEDING: 1
RECTAL PAIN: 1
VOMITING: 0

## 2024-02-20 NOTE — PROGRESS NOTES
Subjective:      Patient ID: Maximiliano Fitch is a 33 y.o. female who presents today for:  Chief Complaint   Patient presents with    Urinary Tract Infection     Feverish, hot/cold chills and burning while voiding. Started today.      Hemorrhoids     Noticed blood 2 days ago while wiping.        Urinary Tract Infection    Hemorrhoids  Associated symptoms include chills. Pertinent negatives include no congestion, coughing, fatigue, fever, headaches, myalgias, nausea, rash, sore throat or vomiting.       She thinks she has a hemorrhoid problem   Yesterday very irritating   Today almost unbearable   Sunday noticed hemorrhoid   Today thought she didnt feel very good   Almost feels feverish   Then she tried to got to the  bathroom and noticed it hurt to urinate so she thinks now she's getting a UTI as well   Its not bad yet, the begging of one   In the past she has had some small hemorrhoids maybe since she had kids   Never been to this point   She tried to ice it Sunday night   When she wipes theres bright red blood on the toilet paper   She hasn't tried anything else besides ice     History reviewed. No pertinent past medical history.  Past Surgical History:   Procedure Laterality Date    CHOLECYSTECTOMY  01/30/2015     Social History     Socioeconomic History    Marital status:      Spouse name: Not on file    Number of children: Not on file    Years of education: Not on file    Highest education level: Not on file   Occupational History    Not on file   Tobacco Use    Smoking status: Never    Smokeless tobacco: Never   Substance and Sexual Activity    Alcohol use: Not on file    Drug use: Not on file    Sexual activity: Not on file   Other Topics Concern    Not on file   Social History Narrative    Not on file     Social Determinants of Health     Financial Resource Strain: Low Risk  (2/15/2022)    Overall Financial Resource Strain (CARDIA)     Difficulty of Paying Living Expenses: Not hard at all   Food

## 2024-02-21 ENCOUNTER — APPOINTMENT (OUTPATIENT)
Dept: OBSTETRICS AND GYNECOLOGY | Facility: CLINIC | Age: 34
End: 2024-02-21
Payer: COMMERCIAL

## 2024-02-21 ASSESSMENT — ENCOUNTER SYMPTOMS
WHEEZING: 0
SORE THROAT: 0
EYE REDNESS: 0
DIARRHEA: 0
COUGH: 0
SHORTNESS OF BREATH: 0
RHINORRHEA: 0
TROUBLE SWALLOWING: 0

## 2024-02-22 LAB
BACTERIA UR CULT: ABNORMAL
BACTERIA UR CULT: ABNORMAL
ORGANISM: ABNORMAL

## 2024-05-03 ENCOUNTER — OFFICE VISIT (OUTPATIENT)
Dept: INTERNAL MEDICINE | Age: 34
End: 2024-05-03
Payer: COMMERCIAL

## 2024-05-03 VITALS
HEIGHT: 63 IN | SYSTOLIC BLOOD PRESSURE: 120 MMHG | DIASTOLIC BLOOD PRESSURE: 84 MMHG | BODY MASS INDEX: 20.2 KG/M2 | HEART RATE: 99 BPM | OXYGEN SATURATION: 99 % | WEIGHT: 114 LBS | TEMPERATURE: 97.2 F

## 2024-05-03 DIAGNOSIS — Z13.220 NEED FOR LIPID SCREENING: ICD-10-CM

## 2024-05-03 DIAGNOSIS — Z00.00 ENCOUNTER FOR WELL ADULT EXAM WITHOUT ABNORMAL FINDINGS: Primary | ICD-10-CM

## 2024-05-03 DIAGNOSIS — B00.1 RECURRENT COLD SORES: ICD-10-CM

## 2024-05-03 PROCEDURE — 99395 PREV VISIT EST AGE 18-39: CPT | Performed by: PHYSICIAN ASSISTANT

## 2024-05-03 RX ORDER — NITROFURANTOIN 25; 75 MG/1; MG/1
100 CAPSULE ORAL 2 TIMES DAILY
Qty: 14 CAPSULE | Refills: 0 | Status: SHIPPED | OUTPATIENT
Start: 2024-05-03 | End: 2024-05-10

## 2024-05-03 RX ORDER — NYSTATIN 100000 [USP'U]/G
POWDER TOPICAL
Qty: 60 G | Refills: 1 | Status: SHIPPED | OUTPATIENT
Start: 2024-05-03

## 2024-05-03 RX ORDER — FAMCICLOVIR 500 MG/1
1500 TABLET ORAL ONCE
Qty: 3 TABLET | Refills: 5 | Status: SHIPPED | OUTPATIENT
Start: 2024-05-03 | End: 2024-05-03

## 2024-05-03 SDOH — ECONOMIC STABILITY: HOUSING INSECURITY
IN THE LAST 12 MONTHS, WAS THERE A TIME WHEN YOU DID NOT HAVE A STEADY PLACE TO SLEEP OR SLEPT IN A SHELTER (INCLUDING NOW)?: NO

## 2024-05-03 SDOH — ECONOMIC STABILITY: INCOME INSECURITY: HOW HARD IS IT FOR YOU TO PAY FOR THE VERY BASICS LIKE FOOD, HOUSING, MEDICAL CARE, AND HEATING?: NOT HARD AT ALL

## 2024-05-03 SDOH — ECONOMIC STABILITY: FOOD INSECURITY: WITHIN THE PAST 12 MONTHS, YOU WORRIED THAT YOUR FOOD WOULD RUN OUT BEFORE YOU GOT MONEY TO BUY MORE.: NEVER TRUE

## 2024-05-03 SDOH — ECONOMIC STABILITY: FOOD INSECURITY: WITHIN THE PAST 12 MONTHS, THE FOOD YOU BOUGHT JUST DIDN'T LAST AND YOU DIDN'T HAVE MONEY TO GET MORE.: NEVER TRUE

## 2024-05-03 NOTE — PROGRESS NOTES
Well Adult Note  Name: Maximiliano Fitch Today’s Date: 5/3/2024   MRN: [id number] Sex: Female   Age: 34 y.o. Ethnicity: Non- / Non    : 1990 Race: White (non-)      Maximiliano Fitch is here for well adult exam.  History:  New concerns - no  Smoker -  none   Alcohol consumption-  occ   Regular exercise -  Yes: runs, gym , yoga  FH- aunt has breast cancer     Review of Systems   All other systems reviewed and are negative.      No Known Allergies      Prior to Visit Medications    Medication Sig Taking? Authorizing Provider   nystatin (MYCOSTATIN) 847441 UNIT/GM powder Apply 3 times daily. Yes Dina Ramirez PA   famciclovir (FAMVIR) 500 MG tablet Take 3 tablets by mouth once for 1 dose Take at the first sign of cold sores Yes Dina Ramirez PA   hydrocortisone (ANUSOL-HC) 2.5 % CREA rectal cream Apply twice a day to affected area Yes Jacy Hutson, APRN - CNP   fluticasone (FLONASE) 50 MCG/ACT nasal spray 2 sprays by Nasal route daily Yes Dina Ramirez PA   levonorgestrel (MIRENA, 52 MG,) IUD 52 mg 1 each by IntraUTERine route once Yes Provider, Historical, MD       History reviewed. No pertinent past medical history.    Past Surgical History:   Procedure Laterality Date    CHOLECYSTECTOMY  2015         Family History   Problem Relation Age of Onset    Cancer Mother     Diabetes Maternal Grandmother     High Blood Pressure Maternal Grandmother     Cancer Maternal Grandfather         Lung       Social History     Tobacco Use    Smoking status: Never     Passive exposure: Never    Smokeless tobacco: Never   Vaping Use    Vaping Use: Never used   Substance Use Topics    Alcohol use: Yes     Comment: rare    Drug use: Never       Objective     Vital Signs  /84 (Site: Right Upper Arm, Position: Sitting, Cuff Size: Small Adult)   Pulse 99   Temp 97.2 °F (36.2 °C)   Ht 1.6 m (5' 3\")   Wt 51.7 kg (114 lb)   SpO2 99%   BMI 20.19 kg/m²   Wt Readings from

## 2025-02-13 ENCOUNTER — APPOINTMENT (OUTPATIENT)
Dept: OBSTETRICS AND GYNECOLOGY | Facility: CLINIC | Age: 35
End: 2025-02-13
Payer: COMMERCIAL

## 2025-02-20 ENCOUNTER — APPOINTMENT (OUTPATIENT)
Dept: OBSTETRICS AND GYNECOLOGY | Facility: CLINIC | Age: 35
End: 2025-02-20
Payer: COMMERCIAL

## 2025-03-20 ENCOUNTER — APPOINTMENT (OUTPATIENT)
Dept: OBSTETRICS AND GYNECOLOGY | Facility: CLINIC | Age: 35
End: 2025-03-20
Payer: COMMERCIAL

## 2025-03-20 VITALS
BODY MASS INDEX: 20.66 KG/M2 | DIASTOLIC BLOOD PRESSURE: 71 MMHG | WEIGHT: 116.6 LBS | SYSTOLIC BLOOD PRESSURE: 129 MMHG | HEIGHT: 63 IN

## 2025-03-20 DIAGNOSIS — N63.10 MASS OF RIGHT BREAST, UNSPECIFIED QUADRANT: ICD-10-CM

## 2025-03-20 DIAGNOSIS — B97.7 HPV IN FEMALE: ICD-10-CM

## 2025-03-20 DIAGNOSIS — Z01.419 WELL FEMALE EXAM WITH ROUTINE GYNECOLOGICAL EXAM: Primary | ICD-10-CM

## 2025-03-20 DIAGNOSIS — R87.612 LGSIL ON PAP SMEAR OF CERVIX: ICD-10-CM

## 2025-03-20 PROCEDURE — 3008F BODY MASS INDEX DOCD: CPT | Performed by: ADVANCED PRACTICE MIDWIFE

## 2025-03-20 PROCEDURE — 1036F TOBACCO NON-USER: CPT | Performed by: ADVANCED PRACTICE MIDWIFE

## 2025-03-20 PROCEDURE — 99395 PREV VISIT EST AGE 18-39: CPT | Performed by: ADVANCED PRACTICE MIDWIFE

## 2025-03-20 RX ORDER — VALACYCLOVIR HYDROCHLORIDE 1 G/1
TABLET, FILM COATED ORAL
COMMUNITY

## 2025-03-20 ASSESSMENT — ENCOUNTER SYMPTOMS
FREQUENCY: 0
ACTIVITY CHANGE: 0
APPETITE CHANGE: 0
ABDOMINAL PAIN: 0
DYSURIA: 0
HEMATURIA: 0

## 2025-03-20 NOTE — PROGRESS NOTES
"Subjective   Desttany ANABEL Love is a 35 y.o. female  who is here for a routine annual gynecology exam.   Concerns for this visit: questions about HPV results and lump on R Breast.     LMP: unknown/mirena  Current contraception: IUD  Menses: n/a  Last pap: 2023 LSIL HPV+ other  History of abnormal Pap smear: yes LSIL hpv + other   Due for pap: yes  Family history of uterine or ovarian cancer: yes - mother maybe  Family history of breast cancer: yes - aunt maternal  Last mammogram: n/a  Gardasil: never received     Review of Systems   Constitutional:  Negative for activity change and appetite change.   Gastrointestinal:  Negative for abdominal pain.   Genitourinary:  Negative for dysuria, frequency and hematuria.       Objective   /71 (BP Location: Left arm, Patient Position: Sitting)   Ht 1.6 m (5' 3\")   Wt 52.9 kg (116 lb 9.6 oz)   LMP  (LMP Unknown)   BMI 20.65 kg/m²   Physical Exam  Neck:      Thyroid: No thyroid mass, thyromegaly or thyroid tenderness.   Cardiovascular:      Rate and Rhythm: Regular rhythm.   Pulmonary:      Effort: Pulmonary effort is normal.   Chest:      Comments: Dense tissue to the R. Breast   Abdominal:      General: Abdomen is flat.      Palpations: Abdomen is soft.   Genitourinary:     General: Normal vulva.      Vagina: Normal.      Cervix: Normal.      Uterus: Normal.       Adnexa: Right adnexa normal and left adnexa normal.      Comments: IUD strings present   Musculoskeletal:      Cervical back: Normal range of motion.   Lymphadenopathy:      Cervical:      Right cervical: No superficial cervical adenopathy.     Left cervical: No superficial cervical adenopathy.   Skin:     General: Skin is warm.   Neurological:      General: No focal deficit present.      Mental Status: She is alert.   Psychiatric:         Mood and Affect: Mood normal.          Assessment/Plan: 35 y.o. yo woman for annual GYN exam    Stone was seen today for annual exam.  Diagnoses and all " orders for this visit:  Well female exam with routine gynecological exam  -     THINPREP PAP TEST (>30)  LGSIL on Pap smear of cervix  HPV in female  Mass of right breast, unspecified quadrant  -     BI mammo bilateral diagnostic tomosynthesis; Future  -     BI US breast limited right; Future       Points of Discussion:  Recommended imaging for right breast lump, will need bilateral mammogram as has not had baseline and ultrasound for lump, discussed reason for diagnostic imaging use  Mirena IUD in appropriate placement, warning S&S reviewed. Placed 2020  Abnormal bleeding parameters that would prompt a call  Current PAP guidelines (ASCCP)  Self breast exam encouraged. Mammogram screening by current guidelines  Diet and exercise  Routine follow up with PCP for health maintenance examination encouraged   Follow up results by portal unless otherwise indicated    F/U 1 year or as needed

## 2025-03-26 PROBLEM — K80.20 CHOLELITHIASIS WITHOUT OBSTRUCTION: Status: ACTIVE | Noted: 2025-03-26

## 2025-03-26 PROBLEM — Z97.5 IUD CONTRACEPTION: Status: ACTIVE | Noted: 2025-03-26

## 2025-03-26 PROBLEM — K81.0 ACUTE CHOLECYSTITIS: Status: RESOLVED | Noted: 2025-03-26 | Resolved: 2025-03-26

## 2025-04-08 LAB
CYTOLOGY CMNT CVX/VAG CYTO-IMP: NORMAL
HPV HR 12 DNA GENITAL QL NAA+PROBE: POSITIVE
HPV HR GENOTYPES PNL CVX NAA+PROBE: POSITIVE
HPV16 DNA SPEC QL NAA+PROBE: NEGATIVE
HPV18 DNA SPEC QL NAA+PROBE: NEGATIVE
LAB AP HPV GENOTYPE QUESTION: YES
LAB AP HPV HR: NORMAL
LABORATORY COMMENT REPORT: NORMAL
PATH REPORT.TOTAL CANCER: NORMAL

## 2025-06-16 ENCOUNTER — APPOINTMENT (OUTPATIENT)
Dept: OBSTETRICS AND GYNECOLOGY | Facility: CLINIC | Age: 35
End: 2025-06-16
Payer: COMMERCIAL

## 2025-06-16 VITALS
BODY MASS INDEX: 20.2 KG/M2 | WEIGHT: 114 LBS | HEART RATE: 102 BPM | DIASTOLIC BLOOD PRESSURE: 95 MMHG | SYSTOLIC BLOOD PRESSURE: 160 MMHG | OXYGEN SATURATION: 98 % | HEIGHT: 63 IN

## 2025-06-16 DIAGNOSIS — B97.7 HPV IN FEMALE: ICD-10-CM

## 2025-06-16 DIAGNOSIS — R87.612 LGSIL ON PAP SMEAR OF CERVIX: Primary | ICD-10-CM

## 2025-06-16 LAB — PREGNANCY TEST URINE, POC: NEGATIVE

## 2025-06-16 PROCEDURE — 57454 BX/CURETT OF CERVIX W/SCOPE: CPT | Performed by: OBSTETRICS & GYNECOLOGY

## 2025-06-16 PROCEDURE — 81025 URINE PREGNANCY TEST: CPT | Performed by: OBSTETRICS & GYNECOLOGY

## 2025-06-16 NOTE — PROGRESS NOTES
Patient ID: Stone Love is a 35 y.o. female.    Colposcopy    Date/Time: 6/16/2025 1:22 PM    Performed by: Bozena Don MD  Authorized by: Bozena Don MD    Procedure location: cervix    Consent:     Patient questions answered: yes      Risks and benefits of the procedure and its alternatives discussed: yes      Consent obtained:  Written    Consent given by:  Patient  Indication:     Cervical indication(s): high-risk HPV positive and cervical LSIL    Pre-procedure:     Speculum was placed in the vagina: yes      Prep solution(s): acetic acid    Procedure:     Colposcopy with: cervical biopsy and endocervical curettage      Biopsy taken: yes      # of biopsies:  1    Biopsy location(s): 11 o'clock    Cervix visibility: fully visualized      SCJ visibility: fully visualized      Acetowhite lesion(s): cervix      Cervical impression: low grade    Post-procedure:     Patient tolerance of procedure:  Patient tolerated the procedure well with no immediate complications    Uncomplicated colposcopy for LSIL, HPV other pap.  Colpo adequate, impression NORMAN 1.  Patient counseled for gardasil vaccination, initially accepted and then decided to think about.  Can call at any time to initiate vaccination series.  Will call patient with colpo results with likely plan for repeat co-testing in 1 year.    Bozena Don MD

## 2025-06-27 LAB
LABORATORY COMMENT REPORT: NORMAL
PATH REPORT.FINAL DX SPEC: NORMAL
PATH REPORT.GROSS SPEC: NORMAL
PATH REPORT.RELEVANT HX SPEC: NORMAL
PATH REPORT.TOTAL CANCER: NORMAL

## 2025-07-07 ENCOUNTER — TELEPHONE (OUTPATIENT)
Dept: OBSTETRICS AND GYNECOLOGY | Facility: CLINIC | Age: 35
End: 2025-07-07